# Patient Record
Sex: MALE | Race: WHITE
[De-identification: names, ages, dates, MRNs, and addresses within clinical notes are randomized per-mention and may not be internally consistent; named-entity substitution may affect disease eponyms.]

---

## 2019-06-05 ENCOUNTER — HOSPITAL ENCOUNTER (OUTPATIENT)
Dept: HOSPITAL 74 - FASU | Age: 84
Discharge: HOME | End: 2019-06-05
Payer: COMMERCIAL

## 2019-08-21 ENCOUNTER — HOSPITAL ENCOUNTER (OUTPATIENT)
Dept: HOSPITAL 74 - FASU | Age: 84
Discharge: HOME | End: 2019-08-21
Attending: OPHTHALMOLOGY
Payer: COMMERCIAL

## 2019-08-21 VITALS — DIASTOLIC BLOOD PRESSURE: 70 MMHG | SYSTOLIC BLOOD PRESSURE: 130 MMHG | HEART RATE: 75 BPM

## 2019-08-21 VITALS — BODY MASS INDEX: 23 KG/M2

## 2019-08-21 VITALS — TEMPERATURE: 98.5 F

## 2019-08-21 DIAGNOSIS — H26.8: Primary | ICD-10-CM

## 2019-08-21 PROCEDURE — 08RJ3JZ REPLACEMENT OF RIGHT LENS WITH SYNTHETIC SUBSTITUTE, PERCUTANEOUS APPROACH: ICD-10-PCS | Performed by: OPHTHALMOLOGY

## 2019-08-21 NOTE — OP
DATE OF OPERATION:  08/21/2019

 

OPERATIVE PROCEDURE:  Lens Phacoemulsification with Posterior Chamber Intraocular

Lens Placement, Right Eye

 

PREOPERATIVE DIAGNOSIS: Visually Significant Cataract of Right Eye

 

POSTOPERATIVE DIAGNOSIS:  Visually Significant Cataract of Right Eye

 

SURGEON:  Frederick Pham MD

 

ANESTHESIA:  MAC

 

ANESTHESIOLOGIST: 

 

PROCEDURE:  The patient was brought to the operating room and placed under monitored

anesthesia care by anesthesia.  A drop of Tetracaine was then placed over the right

eye.  The patient was then prepped and draped in the usual sterile manner.  A

speculum was then placed over the right eye.  The eye was then well irrigated with

copious amounts of BSS (balanced salt solution). 

 

The operating microscope was then moved into position.  A paracentesis was performed

using a 15-degree blade. At this point 0.5 mL of 1% preservative free-lidocaine was

injected into the anterior chamber.  Amvisc plus was then injected into the anterior

chamber.  A clear corneal incision was then formed using a 2.2-mm keratome.  A

capsulorrhexis was then performed in a continuous circular fashion beginning with a

cystotome completed with an Utratas forceps. Hydrodissection was then performed using

BSS on a cannula.  The phaco probe was then introduced through the corneal wound and

the cataract was removed using the phaco chop technique.  Approximately 3 seconds of

absolute phaco time was used.  The remaining cortex was then removed using irrigation

and aspiration with an I/A probe. The capsule was then filled with regular Amvisc and

the capsule was noted to be intact.  A previously selected foldable posterior chamber

intraocular lens was then injected into the capsule through the corneal wound using a

lens injector.  It was then dialed into position using a Sinskey hook.  The Amvisc

was then removed using irrigation and aspiration.  Miostat was then injected through

the paracentesis to constrict the pupil.  The paracentesis and corneal wound were

then hydrated and noted to be watertight. A drop of Maxitrol was then placed over the

eye.  The speculum was removed, and clear shield was taped over the eye. 

 

The patient tolerated the procedure well, and there were no surgical complications. 

The patient was asked to follow up in my office the next day.  

 

 

FREDERICK PHAM M.D.

 

[id number]

DD: 08/21/2019 09:40

DT: 08/21/2019 21:52

Job #:  [id number]